# Patient Record
Sex: FEMALE | Race: BLACK OR AFRICAN AMERICAN | Employment: UNEMPLOYED | URBAN - METROPOLITAN AREA
[De-identification: names, ages, dates, MRNs, and addresses within clinical notes are randomized per-mention and may not be internally consistent; named-entity substitution may affect disease eponyms.]

---

## 2021-03-30 ENCOUNTER — APPOINTMENT (OUTPATIENT)
Dept: CT IMAGING | Age: 51
End: 2021-03-30
Attending: EMERGENCY MEDICINE

## 2021-03-30 ENCOUNTER — APPOINTMENT (OUTPATIENT)
Dept: GENERAL RADIOLOGY | Age: 51
End: 2021-03-30
Attending: EMERGENCY MEDICINE

## 2021-03-30 ENCOUNTER — HOSPITAL ENCOUNTER (EMERGENCY)
Age: 51
Discharge: HOME | End: 2021-03-30
Attending: EMERGENCY MEDICINE

## 2021-03-30 VITALS
SYSTOLIC BLOOD PRESSURE: 175 MMHG | TEMPERATURE: 98.3 F | DIASTOLIC BLOOD PRESSURE: 103 MMHG | HEART RATE: 69 BPM | BODY MASS INDEX: 27.32 KG/M2 | RESPIRATION RATE: 20 BRPM | HEIGHT: 66 IN | OXYGEN SATURATION: 100 % | WEIGHT: 170 LBS

## 2021-03-30 DIAGNOSIS — R07.89 ACUTE CHEST WALL PAIN: Primary | ICD-10-CM

## 2021-03-30 DIAGNOSIS — I10 HYPERTENSION, UNSPECIFIED TYPE: ICD-10-CM

## 2021-03-30 LAB
ALBUMIN SERPL-MCNC: 3.5 G/DL (ref 3.5–5)
ALBUMIN/GLOB SERPL: 0.9 {RATIO} (ref 1.1–2.2)
ALP SERPL-CCNC: 137 U/L (ref 45–117)
ALT SERPL-CCNC: 19 U/L (ref 12–78)
ANION GAP SERPL CALC-SCNC: 8 MMOL/L (ref 5–15)
APTT PPP: 24.4 SEC (ref 22.1–31)
AST SERPL W P-5'-P-CCNC: 14 U/L (ref 15–37)
BASOPHILS # BLD: 0.1 K/UL (ref 0–0.1)
BASOPHILS NFR BLD: 1 % (ref 0–1)
BILIRUB DIRECT SERPL-MCNC: 0.1 MG/DL (ref 0–0.2)
BILIRUB SERPL-MCNC: 0.3 MG/DL (ref 0.2–1)
BUN SERPL-MCNC: 21 MG/DL (ref 6–20)
BUN/CREAT SERPL: 23 (ref 12–20)
CA-I BLD-MCNC: 8.3 MG/DL (ref 8.5–10.1)
CHLORIDE SERPL-SCNC: 103 MMOL/L (ref 97–108)
CO2 SERPL-SCNC: 29 MMOL/L (ref 21–32)
CREAT SERPL-MCNC: 0.9 MG/DL (ref 0.55–1.02)
DIFFERENTIAL METHOD BLD: NORMAL
EOSINOPHIL # BLD: 0.1 K/UL (ref 0–0.4)
EOSINOPHIL NFR BLD: 2 % (ref 0–7)
ERYTHROCYTE [DISTWIDTH] IN BLOOD BY AUTOMATED COUNT: 12.3 % (ref 11.5–14.5)
GLOBULIN SER CALC-MCNC: 4 G/DL (ref 2–4)
GLUCOSE SERPL-MCNC: 111 MG/DL (ref 65–100)
HCT VFR BLD AUTO: 37.7 % (ref 35–47)
HGB BLD-MCNC: 12.6 G/DL (ref 11.5–16)
IMM GRANULOCYTES # BLD AUTO: 0 K/UL (ref 0–0.04)
IMM GRANULOCYTES NFR BLD AUTO: 0 % (ref 0–0.5)
INR PPP: 1 (ref 0.9–1.1)
LIPASE SERPL-CCNC: 98 U/L (ref 73–393)
LYMPHOCYTES # BLD: 2.6 K/UL (ref 0.8–3.5)
LYMPHOCYTES NFR BLD: 33 % (ref 12–49)
MCH RBC QN AUTO: 30.1 PG (ref 26–34)
MCHC RBC AUTO-ENTMCNC: 33.4 G/DL (ref 30–36.5)
MCV RBC AUTO: 90.2 FL (ref 80–99)
MONOCYTES # BLD: 0.5 K/UL (ref 0–1)
MONOCYTES NFR BLD: 6 % (ref 5–13)
NEUTS SEG # BLD: 4.8 K/UL (ref 1.8–8)
NEUTS SEG NFR BLD: 58 % (ref 32–75)
PLATELET # BLD AUTO: 265 K/UL (ref 150–400)
PMV BLD AUTO: 9.7 FL (ref 8.9–12.9)
POTASSIUM SERPL-SCNC: 3.7 MMOL/L (ref 3.5–5.1)
PROT SERPL-MCNC: 7.5 G/DL (ref 6.4–8.2)
PROTHROMBIN TIME: 10.2 SEC (ref 9–11.1)
RBC # BLD AUTO: 4.18 M/UL (ref 3.8–5.2)
SODIUM SERPL-SCNC: 140 MMOL/L (ref 136–145)
THERAPEUTIC RANGE,PTTT: NORMAL SEC (ref 68–109)
TROPONIN I SERPL-MCNC: <0.05 NG/ML
WBC # BLD AUTO: 8 K/UL (ref 3.6–11)

## 2021-03-30 PROCEDURE — 71275 CT ANGIOGRAPHY CHEST: CPT

## 2021-03-30 PROCEDURE — 36415 COLL VENOUS BLD VENIPUNCTURE: CPT

## 2021-03-30 PROCEDURE — 93005 ELECTROCARDIOGRAM TRACING: CPT

## 2021-03-30 PROCEDURE — 96374 THER/PROPH/DIAG INJ IV PUSH: CPT

## 2021-03-30 PROCEDURE — 74177 CT ABD & PELVIS W/CONTRAST: CPT

## 2021-03-30 PROCEDURE — 84484 ASSAY OF TROPONIN QUANT: CPT

## 2021-03-30 PROCEDURE — 74011250637 HC RX REV CODE- 250/637: Performed by: EMERGENCY MEDICINE

## 2021-03-30 PROCEDURE — 74011250636 HC RX REV CODE- 250/636: Performed by: EMERGENCY MEDICINE

## 2021-03-30 PROCEDURE — 85610 PROTHROMBIN TIME: CPT

## 2021-03-30 PROCEDURE — 80048 BASIC METABOLIC PNL TOTAL CA: CPT

## 2021-03-30 PROCEDURE — 99283 EMERGENCY DEPT VISIT LOW MDM: CPT

## 2021-03-30 PROCEDURE — 80076 HEPATIC FUNCTION PANEL: CPT

## 2021-03-30 PROCEDURE — 83690 ASSAY OF LIPASE: CPT

## 2021-03-30 PROCEDURE — 71045 X-RAY EXAM CHEST 1 VIEW: CPT

## 2021-03-30 PROCEDURE — 85730 THROMBOPLASTIN TIME PARTIAL: CPT

## 2021-03-30 PROCEDURE — 85025 COMPLETE CBC W/AUTO DIFF WBC: CPT

## 2021-03-30 PROCEDURE — 74011000636 HC RX REV CODE- 636: Performed by: EMERGENCY MEDICINE

## 2021-03-30 RX ORDER — ACETAMINOPHEN 500 MG
1000 TABLET ORAL ONCE
Status: COMPLETED | OUTPATIENT
Start: 2021-03-30 | End: 2021-03-30

## 2021-03-30 RX ORDER — ASPIRIN 81 MG/1
81 TABLET ORAL DAILY
COMMUNITY

## 2021-03-30 RX ORDER — KETOROLAC TROMETHAMINE 30 MG/ML
30 INJECTION, SOLUTION INTRAMUSCULAR; INTRAVENOUS
Status: COMPLETED | OUTPATIENT
Start: 2021-03-30 | End: 2021-03-30

## 2021-03-30 RX ORDER — LISINOPRIL 10 MG/1
10 TABLET ORAL DAILY
COMMUNITY

## 2021-03-30 RX ADMIN — IOPAMIDOL 100 ML: 755 INJECTION, SOLUTION INTRAVENOUS at 02:20

## 2021-03-30 RX ADMIN — ACETAMINOPHEN 1000 MG: 500 TABLET, FILM COATED ORAL at 01:34

## 2021-03-30 RX ADMIN — KETOROLAC TROMETHAMINE 30 MG: 30 INJECTION, SOLUTION INTRAMUSCULAR; INTRAVENOUS at 01:34

## 2021-03-30 NOTE — ED TRIAGE NOTES
Pt c/o substernal chest pain that radiates to the right x4 hrs pta    Pain started while pt was eating

## 2021-03-30 NOTE — ED PROVIDER NOTES
EMERGENCY DEPARTMENT HISTORY AND PHYSICAL EXAM      Date: 3/30/2021  Patient Name: Michelle Miller    History of Presenting Illness     Chief Complaint   Patient presents with    Chest Pain       History Provided By: Patient with her daughter translating    HPI: Michelle Miller, 44 y.o. female   presents to the ED with cc of right-sided chest pain. Patient complains of right-sided chest pain that started several hours ago after eating. The patient was unable to describe the pain but without radiation. And it is not associated with diaphoresis, nausea, or shortness of breath. The pain is aggravated with movement, duration and palpation. No cough. No fever chills. Patient has a history of hypertension but without any other risk factor for coronary artery disease or DVT. PCP: None    No current facility-administered medications on file prior to encounter. Current Outpatient Medications on File Prior to Encounter   Medication Sig Dispense Refill    lisinopriL (PRINIVIL, ZESTRIL) 10 mg tablet Take 10 mg by mouth daily.  aspirin delayed-release 81 mg tablet Take 81 mg by mouth daily. Past History     Past Medical History:  Past Medical History:   Diagnosis Date    HTN (hypertension)        Past Surgical History:  History reviewed. No pertinent surgical history. Family History:  History reviewed. No pertinent family history. Social History:  Social History     Tobacco Use    Smoking status: Never Smoker    Smokeless tobacco: Never Used   Substance Use Topics    Alcohol use: Not Currently    Drug use: Never       Allergies:  No Known Allergies      Review of Systems   Review of Systems   Constitutional: Negative for activity change, appetite change, chills and fever. HENT: Negative for sore throat. Eyes: Negative for discharge. Respiratory: Negative for shortness of breath. Cardiovascular: Positive for chest pain. Gastrointestinal: Negative for nausea. Endocrine: Negative for polyuria. Genitourinary: Negative for difficulty urinating. Musculoskeletal: Negative for arthralgias. Skin: Negative for rash. Neurological: Negative for headaches. Hematological: Negative for adenopathy. Psychiatric/Behavioral: Negative for dysphoric mood. All other systems reviewed and are negative. Physical Exam   Physical Exam  Vitals signs and nursing note reviewed. Constitutional:       Appearance: Normal appearance. HENT:      Head: Normocephalic and atraumatic. Nose: Nose normal.      Mouth/Throat:      Mouth: Mucous membranes are moist.      Pharynx: Oropharynx is clear. Eyes:      Conjunctiva/sclera: Conjunctivae normal.   Neck:      Musculoskeletal: Neck supple. Cardiovascular:      Rate and Rhythm: Normal rate and regular rhythm. Heart sounds: Normal heart sounds. Comments: Right-sided chest wall tender to palpation the reproduced pain  Pulmonary:      Effort: Pulmonary effort is normal.      Breath sounds: Normal breath sounds. Abdominal:      General: Abdomen is flat. Bowel sounds are normal.      Palpations: Abdomen is soft. Musculoskeletal:      Right lower leg: No edema. Left lower leg: No edema. Skin:     General: Skin is warm and dry. Neurological:      General: No focal deficit present. Mental Status: She is alert and oriented to person, place, and time.    Psychiatric:         Mood and Affect: Mood normal.         Diagnostic Study Results     Labs -     Recent Results (from the past 12 hour(s))   METABOLIC PANEL, BASIC    Collection Time: 03/30/21  1:30 AM   Result Value Ref Range    Sodium 140 136 - 145 mmol/L    Potassium 3.7 3.5 - 5.1 mmol/L    Chloride 103 97 - 108 mmol/L    CO2 29 21 - 32 mmol/L    Anion gap 8 5 - 15 mmol/L    Glucose 111 (H) 65 - 100 mg/dL    BUN 21 (H) 6 - 20 mg/dL    Creatinine 0.90 0.55 - 1.02 mg/dL    BUN/Creatinine ratio 23 (H) 12 - 20      GFR est AA >60 >60 ml/min/1.73m2    GFR est non-AA >60 >60 ml/min/1.73m2    Calcium 8.3 (L) 8.5 - 10.1 mg/dL   TROPONIN I    Collection Time: 03/30/21  1:30 AM   Result Value Ref Range    Troponin-I, Qt. <0.05 <0.05 ng/mL   CBC WITH AUTOMATED DIFF    Collection Time: 03/30/21  1:30 AM   Result Value Ref Range    WBC 8.0 3.6 - 11.0 K/uL    RBC 4.18 3.80 - 5.20 M/uL    HGB 12.6 11.5 - 16.0 g/dL    HCT 37.7 35.0 - 47.0 %    MCV 90.2 80.0 - 99.0 FL    MCH 30.1 26.0 - 34.0 PG    MCHC 33.4 30.0 - 36.5 g/dL    RDW 12.3 11.5 - 14.5 %    PLATELET 084 480 - 760 K/uL    MPV 9.7 8.9 - 12.9 FL    NEUTROPHILS 58 32 - 75 %    LYMPHOCYTES 33 12 - 49 %    MONOCYTES 6 5 - 13 %    EOSINOPHILS 2 0 - 7 %    BASOPHILS 1 0 - 1 %    IMMATURE GRANULOCYTES 0 0.0 - 0.5 %    ABS. NEUTROPHILS 4.8 1.8 - 8.0 K/UL    ABS. LYMPHOCYTES 2.6 0.8 - 3.5 K/UL    ABS. MONOCYTES 0.5 0.0 - 1.0 K/UL    ABS. EOSINOPHILS 0.1 0.0 - 0.4 K/UL    ABS. BASOPHILS 0.1 0.0 - 0.1 K/UL    ABS. IMM. GRANS. 0.0 0.00 - 0.04 K/UL    DF AUTOMATED     HEPATIC FUNCTION PANEL    Collection Time: 03/30/21  1:30 AM   Result Value Ref Range    Protein, total 7.5 6.4 - 8.2 g/dL    Albumin 3.5 3.5 - 5.0 g/dL    Globulin 4.0 2.0 - 4.0 g/dL    A-G Ratio 0.9 (L) 1.1 - 2.2      Bilirubin, total 0.3 0.2 - 1.0 mg/dL    Bilirubin, direct 0.1 0.0 - 0.2 mg/dL    Alk. phosphatase 137 (H) 45 - 117 U/L    AST (SGOT) 14 (L) 15 - 37 U/L    ALT (SGPT) 19 12 - 78 U/L   LIPASE    Collection Time: 03/30/21  1:30 AM   Result Value Ref Range    Lipase 98 73 - 393 U/L   PTT    Collection Time: 03/30/21  1:30 AM   Result Value Ref Range    aPTT 24.4 22.1 - 31.0 sec    aPTT, therapeutic range   68 - 109 sec   PROTHROMBIN TIME + INR    Collection Time: 03/30/21  1:30 AM   Result Value Ref Range    Prothrombin time 10.2 9.0 - 11.1 sec    INR 1.0 0.9 - 1.1         Radiologic Studies -   CT ABD PELV W CONT   Final Result      No pulmonary emboli. No intra-abdominal inflammatory changes or bowel obstruction. Follow-up as clinically indicated. Please see full report.      CTA CHEST W OR W WO CONT   Final Result      No pulmonary emboli.      No intra-abdominal inflammatory changes or bowel obstruction.      Follow-up as clinically indicated. Please see full report.      XR CHEST PORT   Final Result      No airspace disease in the lungs. Follow-up as clinically indicated.        CT Results  (Last 48 hours)               03/30/21 0221  CT ABD PELV W CONT Final result    Impression:      No pulmonary emboli.       No intra-abdominal inflammatory changes or bowel obstruction.       Follow-up as clinically indicated. Please see full report.       Narrative:  CTA chest with intravenous contrast, 3-D MIP images, CT abdomen and pelvis with   intravenous contrast, 100 cc Isovue-370       Dose reduction: All CT scans at this facility are performed using dose reduction   optimization techniques as appropriate to a performed exam including the   following: Automated exposure control, adjustments of the mA and/or kV according   to patient size, or use of iterative reconstruction technique.       INDICATION: Chest pain, abdominal pain       FINDINGS:       No pulmonary emboli are identified. No aneurysm or dissection of thoracic aorta.   Ascending aorta measures about 3.6 cm in AP dimension. No pleural or pericardial   effusions. No mediastinal adenopathy. Slightly prominent axillary nodes may be   reactive. No airspace disease in the lungs. No pneumothorax.       Few liver lesions, largest about 13 mm in the inferior right lobe, are too small   to characterize, may represent cyst or other. Gallbladder is present without   pericholecystic stranding. Spleen and pancreas are unremarkable. No   hydronephrosis on either side. Tiny bilateral renal lesions are too small to   characterize. No aneurysm of abdominal aorta.       No bowel obstruction. Colonic stool and underdistention limit the evaluation for   lesion/mass. No inflammatory changes to suggest diverticulitis or  appendicitis. No abscess. No free intraperitoneal air. Hysterectomy. No free fluid in the   pelvis. No acute fracture in the visualized bony structures. Small lucencies   lower thoracic spine vertebral bodies may represent hemangiomas. Tiny umbilical   defect containing fat.           03/30/21 0219  CTA CHEST W OR W WO CONT Final result    Impression:      No pulmonary emboli. No intra-abdominal inflammatory changes or bowel obstruction. Follow-up as clinically indicated. Please see full report. Narrative:  CTA chest with intravenous contrast, 3-D MIP images, CT abdomen and pelvis with   intravenous contrast, 100 cc Isovue-370       Dose reduction: All CT scans at this facility are performed using dose reduction   optimization techniques as appropriate to a performed exam including the   following: Automated exposure control, adjustments of the mA and/or kV according   to patient size, or use of iterative reconstruction technique. INDICATION: Chest pain, abdominal pain       FINDINGS:       No pulmonary emboli are identified. No aneurysm or dissection of thoracic aorta. Ascending aorta measures about 3.6 cm in AP dimension. No pleural or pericardial   effusions. No mediastinal adenopathy. Slightly prominent axillary nodes may be   reactive. No airspace disease in the lungs. No pneumothorax. Few liver lesions, largest about 13 mm in the inferior right lobe, are too small   to characterize, may represent cyst or other. Gallbladder is present without   pericholecystic stranding. Spleen and pancreas are unremarkable. No   hydronephrosis on either side. Tiny bilateral renal lesions are too small to   characterize. No aneurysm of abdominal aorta. No bowel obstruction. Colonic stool and underdistention limit the evaluation for   lesion/mass. No inflammatory changes to suggest diverticulitis or appendicitis. No abscess. No free intraperitoneal air. Hysterectomy.  No free fluid in the pelvis. No acute fracture in the visualized bony structures. Small lucencies   lower thoracic spine vertebral bodies may represent hemangiomas. Tiny umbilical   defect containing fat. CXR Results  (Last 48 hours)               03/30/21 0125  XR CHEST PORT Final result    Impression:      No airspace disease in the lungs. Follow-up as clinically indicated. Narrative:  Chest one view       INDICATION: Chest pain       FINDINGS:       Heart size is within normal limits. No airspace disease in the lungs. No pleural   effusions. No pneumothorax. No displaced fracture in the visualized bony   structures. Medical Decision Making   I am the first provider for this patient. I reviewed the vital signs, available nursing notes, past medical history, past surgical history, family history and social history. Vital Signs-Reviewed the patient's vital signs. Patient Vitals for the past 12 hrs:   Temp Pulse Resp BP SpO2   03/30/21 0109 98.3 °F (36.8 °C) 69 20 (!) 175/103 100 %       Records Reviewed:     Provider Notes (Medical Decision Making):       ED Course:   Initial assessment performed. The patients presenting problems have been discussed, and they are in agreement with the care plan formulated and outlined with them. I have encouraged them to ask questions as they arise throughout their visit. The pain resolved      PROCEDURES      Disposition: Condition stable   DC- Adult Discharges: All of the diagnostic tests were reviewed and questions answered. Diagnosis, care plan and treatment options were discussed. understand instructions and will follow up as directed. The patients results have been reviewed with them. They have been counseled regarding their diagnosis. The patient verbally convey understanding and agreement of the signs, symptoms, diagnosis, treatment and prognosis and additionally agrees to follow up as recommended.   They also agree with the care-plan and convey that all of their questions have been answered. I have also put together some discharge instructions for them that include: 1) educational information regarding their diagnosis, 2) how to care for their diagnosis at home, as well a 3) list of reasons why they would want to return to the ED prior to their follow-up appointment, should their condition change. PLAN:  1. Current Discharge Medication List        2. Follow-up Information     Follow up With Specialties Details Why Contact Info    Follow up with your primary care physician  Schedule an appointment as soon as possible for a visit in 3 days As needed         Return to ED if worse     Diagnosis     Clinical Impression:   1. Acute chest wall pain    2. Hypertension, unspecified type        Please note that this dictation was completed with [employer], the computer voice recognition software. Quite often unanticipated grammatical, syntax, homophones, and other interpretive errors are inadvertently transcribed by the computer software. Please disregard these errors. Please excuse any errors that have escaped final proofreading. Thank you.

## 2021-03-30 NOTE — LETTER
NOTIFICATION RETURN TO WORK / SCHOOL 
 
3/30/2021 3:10 AM 
 
Ms. [address] [address] To Whom It May Concern: 
 
John Garcia is currently under the care of [address] [place] [address]. She will return to work/school on: 4/1/2021 Dyloncion Donalee Cranker may return to work/school with the following restrictions: n/a If there are questions or concerns please have the patient contact our office.  
 
 
 
Sincerely, 
 
 
Lindy Sal RN

## 2023-05-14 RX ORDER — LISINOPRIL 10 MG/1
10 TABLET ORAL DAILY
COMMUNITY

## 2023-05-14 RX ORDER — ASPIRIN 81 MG/1
81 TABLET ORAL DAILY
COMMUNITY

## 2023-05-22 ENCOUNTER — APPOINTMENT (OUTPATIENT)
Facility: HOSPITAL | Age: 53
End: 2023-05-22

## 2023-05-22 ENCOUNTER — HOSPITAL ENCOUNTER (EMERGENCY)
Facility: HOSPITAL | Age: 53
Discharge: HOME | End: 2023-05-23
Attending: EMERGENCY MEDICINE

## 2023-05-22 VITALS
DIASTOLIC BLOOD PRESSURE: 92 MMHG | RESPIRATION RATE: 18 BRPM | TEMPERATURE: 98.2 F | HEART RATE: 69 BPM | BODY MASS INDEX: 32.47 KG/M2 | SYSTOLIC BLOOD PRESSURE: 147 MMHG | HEIGHT: 66 IN | WEIGHT: 202 LBS | OXYGEN SATURATION: 99 %

## 2023-05-22 DIAGNOSIS — M79.605 LEFT LEG PAIN: Primary | ICD-10-CM

## 2023-05-22 PROCEDURE — 73562 X-RAY EXAM OF KNEE 3: CPT

## 2023-05-22 PROCEDURE — 73610 X-RAY EXAM OF ANKLE: CPT

## 2023-05-22 PROCEDURE — 99283 EMERGENCY DEPT VISIT LOW MDM: CPT

## 2023-05-22 PROCEDURE — 93971 EXTREMITY STUDY: CPT

## 2023-05-22 ASSESSMENT — PAIN - FUNCTIONAL ASSESSMENT: PAIN_FUNCTIONAL_ASSESSMENT: 0-10

## 2023-05-22 ASSESSMENT — PAIN DESCRIPTION - ORIENTATION: ORIENTATION: LEFT;RIGHT

## 2023-05-22 ASSESSMENT — PAIN SCALES - GENERAL: PAINLEVEL_OUTOF10: 8

## 2023-05-22 ASSESSMENT — PAIN DESCRIPTION - LOCATION: LOCATION: SHOULDER

## 2023-05-22 NOTE — CONSULTS
Session ID: [id number]  Request ID: [id number]  Language: Arabic  Status: Fulfilled   ID: #[id number]   Name: Chu Diaz

## 2023-05-22 NOTE — ED TRIAGE NOTES
Patient to ED report L leg swelling and BUE pain x 1 month. 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 L leg is not painful, denies injury to cause swelling or pain.      used: Katie Hartley [id number] Access code [id number]

## 2023-05-23 LAB — ECHO BSA: 2.07 M2

## 2023-05-23 ASSESSMENT — ENCOUNTER SYMPTOMS
SHORTNESS OF BREATH: 0
BACK PAIN: 0
ABDOMINAL PAIN: 0

## 2023-05-23 NOTE — DISCHARGE INSTRUCTIONS
Las pruebas que realizamos hoy no mostraron anomalías preocupantes. Nos gustaría que hiciera un seguimiento con garcia atención primaria para rachel evaluación y un estudio más detallados. Puede jacqui Tylenol e ibuprofeno según sea necesario para controlar el dolor. Si los síntomas empeoran o surgen nuevos síntomas, informe al servicio de urgencias más cercano. Nubia por permitirnos brindarle [employer]. Nos damos cuenta de que tiene muchas opciones para apolinar necesidades de atención de emergencia. Le agradecemos que haya [address] [place] en el futuro para cualquier necesidad continua de atención médica. El examen y el tratamiento que recibió en el Departamento de Emergencias fueron para un problema emergente y no pretenden ser rachel Felicia Jose Manuel. Es importante que vincenzo un seguimiento con un médico, rachel enfermera practicante o un asistente médico para recibir atención continua. Si apolinar síntomas empeoran o no mejora nichelle se esperaba y no puede comunicarse con garcia proveedor de atención médica habitual, debe regresar al Lacey Hernadez. Estamos disponibles las 24 horas del día. Vincenzo rachel mike con garcia(s) proveedor(es) de atención médica para el seguimiento de garcia visita al Lacey Hernadez. Lleve esta hoja con usallegra cuando De Lavinia a garcia visita de seguimiento. The tests that we ran today did not show any concerning abnormalities. We would like for you to follow-up with your primary care for further evaluation and work-up. You may take Tylenol and ibuprofen as needed for pain control. If symptoms worsen or new symptoms arise, please report to the nearest emergency department. Thank you for allowing us to provide you with medical care today. We realize that you have many choices for your emergency care needs. We thank you for choosing [facility]. Please choose us in the future for any continued health care needs.      The exam and treatment you received in the Emergency

## 2023-05-23 NOTE — ED NOTES
Pt given discharge instructions, patient education, 0 prescriptions, and follow up information. Pt verbalizes understanding. All questions answered. Pt discharged to home in private vehicle, ambulatory. Pt A&Ox4, RA, pain controlled.     Work note given       Suzanna Linder RN  05/23/23 2614
and/or specialist(s). Discussed signs or symptoms that would warrant return back to the ED for further evaluation. The patient is agreeable with discharge. Presentation, management, and disposition were discussed with the attending physician, Dr. Ramandeep Graham, who is in agreement with plan of care. Amount and/or Complexity of Data Reviewed  Radiology: ordered. REASSESSMENT            CONSULTS:  None    PROCEDURES:  Unless otherwise noted below, none     Procedures      FINAL IMPRESSION      1.  Left leg pain          DISPOSITION/PLAN   DISPOSITION Decision To Discharge 05/23/2023 12:09:05 AM      PATIENT REFERRED TO:  May[facility]  [phone]    Schedule an appointment as soon as possible for a visit       [facility] EM[facility]PT  [address]  [phone]    As needed, If symptoms worsen      DISCHARGE MEDICATIONS:  Discharge Medication List as of 5/23/2023 12:19 AM            (Please note that portions of this note were completed with a voice recognition program.  Efforts were made to edit the dictations but occasionally words are mis-transcribed.)    Bailee Quach PA-C (electronically signed)  Emergency Attending Physician / Physician Assistant / Nurse Practitioner             Bailee Quach PA-C  05/23/23 0107

## 2024-08-10 ENCOUNTER — APPOINTMENT (OUTPATIENT)
Facility: HOSPITAL | Age: 54
End: 2024-08-10

## 2024-08-10 ENCOUNTER — HOSPITAL ENCOUNTER (EMERGENCY)
Facility: HOSPITAL | Age: 54
Discharge: HOME | End: 2024-08-10
Attending: STUDENT IN AN ORGANIZED HEALTH CARE EDUCATION/TRAINING PROGRAM

## 2024-08-10 VITALS
BODY MASS INDEX: 35.73 KG/M2 | WEIGHT: 182 LBS | OXYGEN SATURATION: 100 % | HEART RATE: 69 BPM | RESPIRATION RATE: 18 BRPM | SYSTOLIC BLOOD PRESSURE: 135 MMHG | TEMPERATURE: 98.6 F | DIASTOLIC BLOOD PRESSURE: 86 MMHG | HEIGHT: 60 IN

## 2024-08-10 DIAGNOSIS — S16.1XXA STRAIN OF NECK MUSCLE, INITIAL ENCOUNTER: Primary | ICD-10-CM

## 2024-08-10 PROCEDURE — 72125 CT NECK SPINE W/O DYE: CPT

## 2024-08-10 PROCEDURE — 6360000002 HC RX W HCPCS

## 2024-08-10 PROCEDURE — 6370000000 HC RX 637 (ALT 250 FOR IP)

## 2024-08-10 PROCEDURE — 99284 EMERGENCY DEPT VISIT MOD MDM: CPT

## 2024-08-10 PROCEDURE — 96372 THER/PROPH/DIAG INJ SC/IM: CPT

## 2024-08-10 RX ORDER — KETOROLAC TROMETHAMINE 30 MG/ML
30 INJECTION, SOLUTION INTRAMUSCULAR; INTRAVENOUS
Status: COMPLETED | OUTPATIENT
Start: 2024-08-10 | End: 2024-08-10

## 2024-08-10 RX ORDER — METHOCARBAMOL 750 MG/1
750 TABLET, FILM COATED ORAL 3 TIMES DAILY PRN
Qty: 21 TABLET | Refills: 0 | Status: SHIPPED | OUTPATIENT
Start: 2024-08-10 | End: 2024-08-17

## 2024-08-10 RX ORDER — DIAZEPAM 5 MG/1
5 TABLET ORAL ONCE
Status: COMPLETED | OUTPATIENT
Start: 2024-08-10 | End: 2024-08-10

## 2024-08-10 RX ADMIN — DIAZEPAM 5 MG: 5 TABLET ORAL at 19:40

## 2024-08-10 RX ADMIN — KETOROLAC TROMETHAMINE 30 MG: 30 INJECTION, SOLUTION INTRAMUSCULAR at 18:10

## 2024-08-10 ASSESSMENT — PAIN - FUNCTIONAL ASSESSMENT: PAIN_FUNCTIONAL_ASSESSMENT: 0-10

## 2024-08-10 ASSESSMENT — PAIN DESCRIPTION - ORIENTATION: ORIENTATION: LEFT;RIGHT

## 2024-08-10 ASSESSMENT — PAIN DESCRIPTION - DESCRIPTORS: DESCRIPTORS: ACHING

## 2024-08-10 ASSESSMENT — LIFESTYLE VARIABLES
HOW OFTEN DO YOU HAVE A DRINK CONTAINING ALCOHOL: NEVER
HOW MANY STANDARD DRINKS CONTAINING ALCOHOL DO YOU HAVE ON A TYPICAL DAY: PATIENT DOES NOT DRINK

## 2024-08-10 ASSESSMENT — ENCOUNTER SYMPTOMS: SHORTNESS OF BREATH: 0

## 2024-08-10 ASSESSMENT — PAIN SCALES - GENERAL: PAINLEVEL_OUTOF10: 10

## 2024-08-10 NOTE — DISCHARGE INSTRUCTIONS
Take ibuprofen and tylenol as needed for pain. Make sure to take ibuprofen with food.     Take Robaxin as needed for muscle spasms. DO NOT DRIVE AFTER TAKING THIS MEDICATION.     Follow-up closely with an orthopedic provider for reevaluation. Call for appointment as soon as possible.

## 2024-08-10 NOTE — ED NOTES
Bedside shift change report given to Immanuel (oncoming nurse) by Krystal (offgoing nurse). Report included the following information Nurse Handoff Report .

## 2024-08-10 NOTE — ED PROVIDER NOTES
General: No focal deficit present.      Mental Status: She is alert and oriented to person, place, and time. Mental status is at baseline.      Sensory: No sensory deficit.      Gait: Gait normal.   Psychiatric:         Mood and Affect: Mood normal.         DIAGNOSTIC RESULTS     EKG: All EKG's are interpreted by the Emergency Department Physician who either signs or Co-signs this chart in the absence of a cardiologist.    RADIOLOGY:   Non-plain film images such as CT, Ultrasound and MRI are read by the radiologist. Plain radiographic images are visualized and preliminarily interpreted by the emergency physician with the below findings:    Interpretation per the Radiologist below, if available at the time of this note:    CT CERVICAL SPINE WO CONTRAST   Final Result      1.  No acute osseous abnormality.   2. Multilevel degenerative spondylosis.          Electronically signed by Hugh Ratliff MD           LABS:  Labs Reviewed - No data to display    All other labs were within normal range or not returned as of this dictation.    EMERGENCY DEPARTMENT COURSE and DIFFERENTIAL DIAGNOSIS/MDM:   Vitals:    Vitals:    08/10/24 1736   BP: 135/86   Pulse: 69   Resp: 18   Temp: 98.6 °F (37 °C)   TempSrc: Oral   SpO2: 100%   Weight: 82.6 kg (182 lb)   Height: 1.524 m (5')       Medical Decision Making  Amount and/or Complexity of Data Reviewed  Radiology: ordered.    Risk  Prescription drug management.    Patient is a 54-year-old female presenting to the emergency room complaining of worsening neck pain for the past 2 months. Doubt meningitis given pt is afebrile and non-toxic appearing. Doubt acute fracture given CT scan. CT scan did note \"multilevel degenerative spondylosis\". Suspect muscle spasms given muscle tightness was noted w/ palpation. Pt is given a dose of toradol and valium while in the ED. Pt is given prescription for robaxin to take as needed for muscle spasms.  Patient is educated not to drive after taking the  Robaxin.  Patient is also encouraged to follow-up closely with orthopedic provider for reevaluation. Strict return to ED precautions given. ACI discussed with the patient; see instruction below. Patient verbalized understanding.    CONSULTS:  None    PROCEDURES:  Unless otherwise noted below, none     Procedures      FINAL IMPRESSION      1. Strain of neck muscle, initial encounter          DISPOSITION/PLAN   DISPOSITION Decision To Discharge 08/10/2024 07:41:15 PM      PATIENT REFERRED TO:  [facility] [place] [id number] [place] [id number]  [phone]  Schedule an appointment as soon as possible for a visit in 5 days        DISCHARGE MEDICATIONS:  Discharge Medication List as of 8/10/2024  7:11 PM        START taking these medications    Details   methocarbamol (ROBAXIN-750) 750 MG tablet Take 1 tablet by mouth 3 times daily as needed (muscle spasms), Disp-21 tablet, R-0Normal               (Please note that portions of this note were completed with a voice recognition program.  Efforts were made to edit the dictations but occasionally words are mis-transcribed.)    KARLO Rodríguez NP (electronically signed)  Emergency Attending Physician / Physician Assistant / Nurse Practitioner             Ellen Azul APRN - NP  08/10/24 3752

## 2024-08-10 NOTE — ED TRIAGE NOTES
Pt arrives with c/o neck pain radiating into both arms. Pt had similar symptoms 7 years ago. 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 Pt states pain is so bad that she cannot sleep. Pt took Ibuprofen 3 days ago.     Pt refusing to use  services. She states she brought her son in law to speak on her behalf.  [id number] on during  triage.

## 2024-08-10 NOTE — ED NOTES
Discharge instructions reviewed, discharge papers given and pt teaching completed. (1) prescription(s) discussed. Pt instructed to follow up with PCP and [facility] [place] regarding today's visit. Pt also instructed to report to ED if symptoms return, worsen, don't subside, and/or with onset of new symptoms.

## 2024-08-21 ENCOUNTER — APPOINTMENT (OUTPATIENT)
Facility: HOSPITAL | Age: 54
End: 2024-08-21

## 2024-08-21 ENCOUNTER — HOSPITAL ENCOUNTER (EMERGENCY)
Facility: HOSPITAL | Age: 54
Discharge: HOME | End: 2024-08-21
Attending: STUDENT IN AN ORGANIZED HEALTH CARE EDUCATION/TRAINING PROGRAM

## 2024-08-21 VITALS
BODY MASS INDEX: 33.49 KG/M2 | OXYGEN SATURATION: 99 % | TEMPERATURE: 98.3 F | RESPIRATION RATE: 24 BRPM | HEIGHT: 62 IN | HEART RATE: 63 BPM | WEIGHT: 182 LBS | DIASTOLIC BLOOD PRESSURE: 96 MMHG | SYSTOLIC BLOOD PRESSURE: 157 MMHG

## 2024-08-21 DIAGNOSIS — R07.9 CHEST PAIN, UNSPECIFIED TYPE: Primary | ICD-10-CM

## 2024-08-21 LAB
ALBUMIN SERPL-MCNC: 3.9 G/DL (ref 3.5–5.2)
ALBUMIN/GLOB SERPL: 1.1 (ref 1.1–2.2)
ALP SERPL-CCNC: 100 U/L (ref 35–104)
ALT SERPL-CCNC: 15 U/L (ref 10–35)
ANION GAP SERPL CALC-SCNC: 9 MMOL/L (ref 5–15)
AST SERPL-CCNC: 19 U/L (ref 10–35)
BASOPHILS # BLD: 0 K/UL (ref 0–1)
BASOPHILS NFR BLD: 0 % (ref 0–1)
BILIRUB SERPL-MCNC: 0.3 MG/DL (ref 0.2–1)
BUN SERPL-MCNC: 20 MG/DL (ref 6–20)
BUN/CREAT SERPL: 32 (ref 12–20)
CALCIUM SERPL-MCNC: 9 MG/DL (ref 8.6–10)
CHLORIDE SERPL-SCNC: 102 MMOL/L (ref 98–107)
CO2 SERPL-SCNC: 30 MMOL/L (ref 22–29)
COMMENT:: NORMAL
CREAT SERPL-MCNC: 0.62 MG/DL (ref 0.5–0.9)
DIFFERENTIAL METHOD BLD: ABNORMAL
EOSINOPHIL # BLD: 0.2 K/UL (ref 0–0.4)
EOSINOPHIL NFR BLD: 3 %
ERYTHROCYTE [DISTWIDTH] IN BLOOD BY AUTOMATED COUNT: 12.9 % (ref 11.5–14.5)
GLOBULIN SER CALC-MCNC: 3.7 G/DL (ref 2–4)
GLUCOSE SERPL-MCNC: 97 MG/DL (ref 65–100)
HCT VFR BLD AUTO: 35.9 % (ref 35–47)
HGB BLD-MCNC: 11.9 G/DL (ref 11.5–16)
IMM GRANULOCYTES # BLD AUTO: 0 K/UL (ref 0–0.04)
IMM GRANULOCYTES NFR BLD AUTO: 0 % (ref 0–0.5)
LIPASE SERPL-CCNC: 26 U/L (ref 13–60)
LYMPHOCYTES # BLD: 2.1 K/UL (ref 0.8–3.5)
LYMPHOCYTES NFR BLD: 29 % (ref 12–49)
MCH RBC QN AUTO: 29.1 PG (ref 26–34)
MCHC RBC AUTO-ENTMCNC: 33.1 G/DL (ref 30–36.5)
MCV RBC AUTO: 87.8 FL (ref 80–99)
MONOCYTES # BLD: 0.6 K/UL (ref 0–1)
MONOCYTES NFR BLD: 8 % (ref 5–13)
NEUTS SEG # BLD: 4.3 K/UL (ref 1.8–8)
NEUTS SEG NFR BLD: 60 % (ref 32–75)
NRBC # BLD: 0 K/UL (ref 0–0.01)
NRBC BLD-RTO: 0 PER 100 WBC
PLATELET # BLD AUTO: 278 K/UL (ref 150–400)
PMV BLD AUTO: 9.5 FL (ref 8.9–12.9)
POTASSIUM SERPL-SCNC: 3.8 MMOL/L (ref 3.5–5.1)
PROT SERPL-MCNC: 7.6 G/DL (ref 6.4–8.3)
RBC # BLD AUTO: 4.09 M/UL (ref 3.8–5.2)
SODIUM SERPL-SCNC: 141 MMOL/L (ref 136–145)
SPECIMEN HOLD: NORMAL
TROPONIN T SERPL HS-MCNC: 7.7 NG/L (ref 0–14)
WBC # BLD AUTO: 7.1 K/UL (ref 3.6–11)

## 2024-08-21 PROCEDURE — 93005 ELECTROCARDIOGRAM TRACING: CPT | Performed by: NURSE PRACTITIONER

## 2024-08-21 PROCEDURE — 85025 COMPLETE CBC W/AUTO DIFF WBC: CPT

## 2024-08-21 PROCEDURE — 71045 X-RAY EXAM CHEST 1 VIEW: CPT

## 2024-08-21 PROCEDURE — 83690 ASSAY OF LIPASE: CPT

## 2024-08-21 PROCEDURE — 96374 THER/PROPH/DIAG INJ IV PUSH: CPT

## 2024-08-21 PROCEDURE — 84484 ASSAY OF TROPONIN QUANT: CPT

## 2024-08-21 PROCEDURE — 6360000002 HC RX W HCPCS: Performed by: NURSE PRACTITIONER

## 2024-08-21 PROCEDURE — 99285 EMERGENCY DEPT VISIT HI MDM: CPT

## 2024-08-21 PROCEDURE — 36415 COLL VENOUS BLD VENIPUNCTURE: CPT

## 2024-08-21 PROCEDURE — 6370000000 HC RX 637 (ALT 250 FOR IP): Performed by: NURSE PRACTITIONER

## 2024-08-21 PROCEDURE — 80053 COMPREHEN METABOLIC PANEL: CPT

## 2024-08-21 RX ORDER — ASPIRIN 325 MG
325 TABLET ORAL
Status: COMPLETED | OUTPATIENT
Start: 2024-08-21 | End: 2024-08-21

## 2024-08-21 RX ORDER — CLONIDINE HYDROCHLORIDE 0.1 MG/1
0.1 TABLET ORAL
Status: COMPLETED | OUTPATIENT
Start: 2024-08-21 | End: 2024-08-21

## 2024-08-21 RX ORDER — KETOROLAC TROMETHAMINE 30 MG/ML
30 INJECTION, SOLUTION INTRAMUSCULAR; INTRAVENOUS
Status: COMPLETED | OUTPATIENT
Start: 2024-08-21 | End: 2024-08-21

## 2024-08-21 RX ORDER — METOPROLOL TARTRATE AND HYDROCHLOROTHIAZIDE 50; 25 MG/1; MG/1
1 TABLET ORAL DAILY
COMMUNITY

## 2024-08-21 RX ADMIN — KETOROLAC TROMETHAMINE 30 MG: 30 INJECTION, SOLUTION INTRAMUSCULAR at 21:42

## 2024-08-21 RX ADMIN — CLONIDINE HYDROCHLORIDE 0.1 MG: 0.1 TABLET ORAL at 21:42

## 2024-08-21 RX ADMIN — ASPIRIN 325 MG: 325 TABLET ORAL at 21:42

## 2024-08-21 ASSESSMENT — ENCOUNTER SYMPTOMS: DIARRHEA: 1

## 2024-08-21 ASSESSMENT — PAIN SCALES - GENERAL: PAINLEVEL_OUTOF10: 4

## 2024-08-21 ASSESSMENT — PAIN - FUNCTIONAL ASSESSMENT
PAIN_FUNCTIONAL_ASSESSMENT: 0-10
PAIN_FUNCTIONAL_ASSESSMENT: 0-10

## 2024-08-22 NOTE — ED TRIAGE NOTES
Pt.presents for evaluation of chest pain onset x 1 week.  Pt.states that she was at work today when pain became severe.   Took BP at home and ws 152/98.  On new BP med x 2 months.  Pt.also states headache.  Diarrhea yesterday only.    Takes baby ASA daily with last dose 1000

## 2024-08-22 NOTE — DISCHARGE INSTRUCTIONS
Today, you were seen in the ER for chest pain. Your EKG, chest x-ray, and bloodwork were reassuring.  Take medications as prescribed at discharge. However, things can change, and you should return to the ER or call 911 if you have: worsening of your chest pain, difficulty breathing, or any other new or concerning symptoms, as these may be a sign of a new problem or worsening of your condition. Please follow-up with your primary doctor within 1-2 days for re-evaluation. We also recommend that you follow up with a specific cardiologist as well, please call today to set up an appointment.  If for any reason you cannot get in touch with that cardiologist, please see the list of local groups for follow up:     [facility] [place]  [address] , [address]  [place], [place] [address] [phone]    [facility] [place]  [address]  [place], [place] [address] [phone]    [facility] [place]  [address]  [place], [place] [address] [phone]    [facility] [place]  [address]  [place], [place] [address] [phone]    [place] [facility]:  [phone]    [facility] Phone: [phone]

## 2024-08-22 NOTE — ED PROVIDER NOTES
[facility] EMERGENCY DEPT  EMERGENCY DEPARTMENT ENCOUNTER      Pt Name: Meagan Johnson  MRN: [id number]  Birthdate 1970  Date of evaluation: 8/21/2024  Provider: KARLO Waters NP    CHIEF COMPLAINT       Chief Complaint   Patient presents with    Chest Pain         HISTORY OF PRESENT ILLNESS   (Location/Symptom, Timing/Onset, Context/Setting, Quality, Duration, Modifying Factors, Severity)  Note limiting factors.   The history is provided by the patient and a relative. A  was used.       Meagan Johnson is a 54 y.o. female with Hx of HTN who presents ambulatory to [place] ED with cc of chest pain.     Patient reports generalized, nonexertional chest pain that started 1w ago associated with elevated blood pressure reading of 150s over 90s at home.  States that she has been under a lot of stress.  Was seen here 2 weeks ago for presumed musculoskeletal neck pain.  In the last 2 months he started to have elevated blood pressure readings and was placed on metoprolol/ HCTZ combo pill within the last 2 months.  Is also taking lisinopril.  Takes her blood pressure medications in the morning. Had generalized HA, fatigue, and nonbloody diarrhea yesterday.     Denies any recent F/C, N/V, cough, congestion, SOB, urinary concerns. Denies tobacco/ ETOH/ substance.     PCP: Ariadne Overton MD    There are no other complaints, changes or physical findings at this time.      Review of External Medical Records:     Nursing Notes were reviewed.    REVIEW OF SYSTEMS    (2-9 systems for level 4, 10 or more for level 5)     Review of Systems   Constitutional:  Positive for fatigue.   Cardiovascular:  Positive for chest pain.   Gastrointestinal:  Positive for diarrhea.   Neurological:  Positive for headaches.       Except as noted above the remainder of the review of systems was reviewed and negative.       PAST MEDICAL HISTORY     Past Medical History:   Diagnosis Date    HTN

## 2024-08-22 NOTE — FLOWSHEET NOTE
Discharge instruction reviewed by documenting RN with the patient and family.  The patient and family verbalized understanding. Patient provided with AVS.  patient provided with return to work note.     Patient is ambulatory and steady gait upon discharge. Patient is AAOX4, breathing even and unlabored, skin warm and dry, skin intact.    Patient mobility status  with no difficulty. Provider aware     Patient left ED via Discharge Method: ambulatory to Home with Child.    Opportunity for questions and clarification provided.     Patient given 0 scripts.

## 2024-08-23 LAB
EKG ATRIAL RATE: 71 BPM
EKG DIAGNOSIS: NORMAL
EKG P AXIS: 70 DEGREES
EKG P-R INTERVAL: 206 MS
EKG Q-T INTERVAL: 380 MS
EKG QRS DURATION: 78 MS
EKG QTC CALCULATION (BAZETT): 412 MS
EKG R AXIS: 43 DEGREES
EKG T AXIS: 26 DEGREES
EKG VENTRICULAR RATE: 71 BPM

## 2024-08-23 PROCEDURE — 93010 ELECTROCARDIOGRAM REPORT: CPT | Performed by: SPECIALIST

## 2025-01-18 ENCOUNTER — HOSPITAL ENCOUNTER (EMERGENCY)
Facility: HOSPITAL | Age: 55
Discharge: HOME | End: 2025-01-18
Attending: STUDENT IN AN ORGANIZED HEALTH CARE EDUCATION/TRAINING PROGRAM
Payer: COMMERCIAL

## 2025-01-18 VITALS
RESPIRATION RATE: 20 BRPM | WEIGHT: 181 LBS | OXYGEN SATURATION: 99 % | TEMPERATURE: 98.6 F | SYSTOLIC BLOOD PRESSURE: 136 MMHG | HEART RATE: 88 BPM | DIASTOLIC BLOOD PRESSURE: 89 MMHG | BODY MASS INDEX: 33.31 KG/M2 | HEIGHT: 62 IN

## 2025-01-18 DIAGNOSIS — M79.602 BILATERAL ARM PAIN: Primary | ICD-10-CM

## 2025-01-18 DIAGNOSIS — M79.601 BILATERAL ARM PAIN: Primary | ICD-10-CM

## 2025-01-18 PROCEDURE — 93005 ELECTROCARDIOGRAM TRACING: CPT | Performed by: STUDENT IN AN ORGANIZED HEALTH CARE EDUCATION/TRAINING PROGRAM

## 2025-01-18 PROCEDURE — 6360000002 HC RX W HCPCS: Performed by: STUDENT IN AN ORGANIZED HEALTH CARE EDUCATION/TRAINING PROGRAM

## 2025-01-18 PROCEDURE — 96372 THER/PROPH/DIAG INJ SC/IM: CPT

## 2025-01-18 PROCEDURE — 99284 EMERGENCY DEPT VISIT MOD MDM: CPT

## 2025-01-18 RX ORDER — LIDOCAINE 50 MG/G
1 PATCH TOPICAL DAILY
Qty: 10 PATCH | Refills: 0 | Status: SHIPPED | OUTPATIENT
Start: 2025-01-18 | End: 2025-01-28

## 2025-01-18 RX ORDER — KETOROLAC TROMETHAMINE 30 MG/ML
30 INJECTION, SOLUTION INTRAMUSCULAR; INTRAVENOUS ONCE
Status: COMPLETED | OUTPATIENT
Start: 2025-01-18 | End: 2025-01-18

## 2025-01-18 RX ORDER — IBUPROFEN 800 MG/1
800 TABLET, FILM COATED ORAL 3 TIMES DAILY PRN
Qty: 90 TABLET | Refills: 0 | Status: SHIPPED | OUTPATIENT
Start: 2025-01-18

## 2025-01-18 RX ADMIN — KETOROLAC TROMETHAMINE 30 MG: 30 INJECTION, SOLUTION INTRAMUSCULAR at 23:33

## 2025-01-18 ASSESSMENT — PAIN DESCRIPTION - LOCATION: LOCATION: ARM

## 2025-01-18 ASSESSMENT — LIFESTYLE VARIABLES
HOW MANY STANDARD DRINKS CONTAINING ALCOHOL DO YOU HAVE ON A TYPICAL DAY: PATIENT DOES NOT DRINK
HOW OFTEN DO YOU HAVE A DRINK CONTAINING ALCOHOL: NEVER

## 2025-01-18 ASSESSMENT — PAIN SCALES - GENERAL: PAINLEVEL_OUTOF10: 8

## 2025-01-18 ASSESSMENT — PAIN DESCRIPTION - ONSET: ONSET: PROGRESSIVE

## 2025-01-18 ASSESSMENT — PAIN DESCRIPTION - DESCRIPTORS: DESCRIPTORS: DISCOMFORT

## 2025-01-18 ASSESSMENT — PAIN DESCRIPTION - FREQUENCY: FREQUENCY: CONTINUOUS

## 2025-01-18 ASSESSMENT — PAIN DESCRIPTION - ORIENTATION: ORIENTATION: RIGHT;LEFT

## 2025-01-18 ASSESSMENT — PAIN - FUNCTIONAL ASSESSMENT: PAIN_FUNCTIONAL_ASSESSMENT: PREVENTS OR INTERFERES SOME ACTIVE ACTIVITIES AND ADLS

## 2025-01-18 ASSESSMENT — PAIN DESCRIPTION - PAIN TYPE: TYPE: ACUTE PAIN

## 2025-01-19 LAB
EKG ATRIAL RATE: 81 BPM
EKG DIAGNOSIS: NORMAL
EKG P AXIS: 59 DEGREES
EKG P-R INTERVAL: 206 MS
EKG Q-T INTERVAL: 366 MS
EKG QRS DURATION: 86 MS
EKG QTC CALCULATION (BAZETT): 425 MS
EKG R AXIS: 37 DEGREES
EKG T AXIS: 20 DEGREES
EKG VENTRICULAR RATE: 81 BPM

## 2025-01-19 NOTE — ED TRIAGE NOTES
ED visit d/t bilat bicep arm pain -  onset of sxs, 3 to 4 months ago - Endorses, persistent arm pain bilat - no apparent swelling nor bruising noted - Note, pt reports pushing and pulling heavy equipment at work - Denies sob / cp / vision changes / fevers / change in sensation / coughing / palpitations

## 2025-01-19 NOTE — ED NOTES
Pt refusing to utilize emergency department's green ipad  service. Pt has friend/family member that is in [facility], with his wife who is also a patient, who the pt is requesting translate for her.

## 2025-01-19 NOTE — ED PROVIDER NOTES
symptoms.  Likely muscular soreness from overuse.  Recommend multimodal pain control, strict return precautions, PCP follow-up.    Amount and/or Complexity of Data Reviewed  Independent Historian: spouse  ECG/medicine tests: ordered. Decision-making details documented in ED Course.    Risk  Prescription drug management.        REASSESSMENT     ED Course as of 01/18/25 2340   Sat Jan 18, 2025   2338 EKG 12 Lead  EKG interpreted by myself at the time of this note. Normal sinus rhythm. Normal rate, normal axis. No STEMI. Reassuring EKG. [AS]      ED Course User Index  [AS] Francisco Arguello MD       CONSULTS:  None    PROCEDURES:  Procedures    FINAL IMPRESSION      1. Bilateral arm pain          DISPOSITION/PLAN   DISPOSITION Decision To Discharge 01/18/2025 11:40:00 PM    (Please note that portions of this note were completed with a voice recognition program.  Efforts were made to edit the dictations but occasionally words are mis-transcribed.)    Francisco Arguello MD (electronically signed)  Emergency Attending Physician      Francisco Arguello MD  01/18/25 0999

## 2025-04-14 ENCOUNTER — TRANSCRIBE ORDERS (OUTPATIENT)
Facility: HOSPITAL | Age: 55
End: 2025-04-14

## 2025-04-14 ENCOUNTER — HOSPITAL ENCOUNTER (OUTPATIENT)
Facility: HOSPITAL | Age: 55
Discharge: HOME | End: 2025-04-17
Payer: COMMERCIAL

## 2025-04-14 DIAGNOSIS — Z12.31 OTHER SCREENING MAMMOGRAM: ICD-10-CM

## 2025-04-14 DIAGNOSIS — Z12.31 OTHER SCREENING MAMMOGRAM: Primary | ICD-10-CM

## 2025-04-14 PROCEDURE — 77063 BREAST TOMOSYNTHESIS BI: CPT
